# Patient Record
Sex: FEMALE | Race: WHITE | Employment: UNEMPLOYED | URBAN - METROPOLITAN AREA
[De-identification: names, ages, dates, MRNs, and addresses within clinical notes are randomized per-mention and may not be internally consistent; named-entity substitution may affect disease eponyms.]

---

## 2019-08-18 ENCOUNTER — HOSPITAL ENCOUNTER (EMERGENCY)
Age: 44
Discharge: HOME | End: 2019-08-19
Attending: EMERGENCY MEDICINE
Payer: SELF-PAY

## 2019-08-18 DIAGNOSIS — R51.9 RIGHT FACIAL PAIN: Primary | ICD-10-CM

## 2019-08-18 DIAGNOSIS — K04.7 PERIAPICAL ABSCESS: ICD-10-CM

## 2019-08-18 PROCEDURE — 99283 EMERGENCY DEPT VISIT LOW MDM: CPT

## 2019-08-19 VITALS
DIASTOLIC BLOOD PRESSURE: 108 MMHG | HEART RATE: 89 BPM | RESPIRATION RATE: 16 BRPM | BODY MASS INDEX: 35.32 KG/M2 | OXYGEN SATURATION: 100 % | TEMPERATURE: 98.5 F | SYSTOLIC BLOOD PRESSURE: 150 MMHG | WEIGHT: 212 LBS | HEIGHT: 65 IN

## 2019-08-19 RX ORDER — HYDROCODONE BITARTRATE AND ACETAMINOPHEN 5; 325 MG/1; MG/1
1 TABLET ORAL
Qty: 10 TAB | Refills: 0 | Status: SHIPPED | OUTPATIENT
Start: 2019-08-19 | End: 2019-08-24

## 2019-08-19 RX ORDER — CLINDAMYCIN HYDROCHLORIDE 150 MG/1
300 CAPSULE ORAL
Status: DISCONTINUED | OUTPATIENT
Start: 2019-08-19 | End: 2019-08-19

## 2019-08-19 RX ORDER — CLINDAMYCIN HYDROCHLORIDE 150 MG/1
300 CAPSULE ORAL EVERY 6 HOURS
Qty: 80 CAP | Refills: 0 | Status: SHIPPED | OUTPATIENT
Start: 2019-08-19 | End: 2019-08-26

## 2019-08-19 RX ORDER — IBUPROFEN 800 MG/1
800 TABLET ORAL
Qty: 20 TAB | Refills: 0 | Status: SHIPPED | OUTPATIENT
Start: 2019-08-19 | End: 2019-08-26

## 2019-08-19 NOTE — ED PROVIDER NOTES
EMERGENCY DEPARTMENT HISTORY AND PHYSICAL EXAM    Date: 8/18/2019  Patient Name: Diana Salas    History of Presenting Illness     Chief Complaint   Patient presents with    Dental Pain         History Provided By: Patient    Chief Complaint: dental pain       Additional History (Context):   12:45 AM  Diana Salas is a 38 y.o. female  presents to the emergency department C/O right lower dental pain and associated facial swelling gradually worsening over a week. She denies any difficulty opening her mouth or swallowing no fevers no drainage. She has no other medical problems. PCP: None    Current Outpatient Medications   Medication Sig Dispense Refill    clindamycin (CLEOCIN) 150 mg capsule Take 2 Caps by mouth every six (6) hours for 10 days. 80 Cap 0    ibuprofen (MOTRIN) 800 mg tablet Take 1 Tab by mouth every six (6) hours as needed for Pain for up to 7 days. 20 Tab 0    HYDROcodone-acetaminophen (NORCO) 5-325 mg per tablet Take 1 Tab by mouth every six (6) hours as needed for Pain for up to 5 days. Max Daily Amount: 4 Tabs. 10 Tab 0       Past History     Past Medical History:  History reviewed. No pertinent past medical history. Past Surgical History:  Past Surgical History:   Procedure Laterality Date    HX CHOLECYSTECTOMY         Family History:  History reviewed. No pertinent family history. Social History:  Social History     Tobacco Use    Smoking status: Never Smoker    Smokeless tobacco: Never Used   Substance Use Topics    Alcohol use: Yes     Comment: socially    Drug use: Never       Allergies:  No Known Allergies    Review of Systems   Review of Systems   Constitutional: Negative for chills and fever. HENT: Positive for dental problem and facial swelling. Respiratory: Negative for shortness of breath. Cardiovascular: Negative for chest pain. Neurological: Negative for weakness and numbness. All other systems reviewed and are negative.       Physical Exam     Vitals: 08/18/19 2256 08/19/19 0045   BP: 160/90 (!) 150/108   Pulse: 100 89   Resp: 18 16   Temp: 98.5 °F (36.9 °C)    SpO2: 100%    Weight: 96.2 kg (212 lb)    Height: 5' 5\" (1.651 m)      Physical Exam   Constitutional: She is oriented to person, place, and time. She appears well-developed and well-nourished. No distress. Alert, non toxic, appears slightly uncomfortable   HENT:   Head: Normocephalic and atraumatic. Right Ear: Tympanic membrane, external ear and ear canal normal.   Left Ear: Tympanic membrane, external ear and ear canal normal.   Nose: Nose normal.   Mouth/Throat: Uvula is midline, oropharynx is clear and moist and mucous membranes are normal. Mucous membranes are not dry. No trismus in the jaw. Abnormal dentition. Dental abscesses and dental caries present. No oropharyngeal exudate, posterior oropharyngeal edema, posterior oropharyngeal erythema or tonsillar abscesses. No sublingual tenderness or swelling  Able to swallow secretions  Able to open mouth fully  No trismus      Neck: Normal range of motion. Neck supple. Cardiovascular: Normal rate, regular rhythm and normal heart sounds. Pulmonary/Chest: Effort normal and breath sounds normal. No stridor. No respiratory distress. She has no wheezes. She has no rales. Lymphadenopathy:     She has no cervical adenopathy. Neurological: She is alert and oriented to person, place, and time. Skin: Skin is warm and dry. She is not diaphoretic. Psychiatric: She has a normal mood and affect. Judgment normal.   Nursing note and vitals reviewed. Diagnostic Study Results     Labs:   No results found for this or any previous visit (from the past 12 hour(s)). Radiologic Studies:   No orders to display     CT Results  (Last 48 hours)    None        CXR Results  (Last 48 hours)    None          Medical Decision Making   I am the first provider for this patient.     I reviewed the vital signs, available nursing notes, past medical history, past surgical history, family history and social history. Vital Signs: Reviewed the patient's vital signs. Pulse Oximetry Analysis: 100% on RA       Records Reviewed: Nursing Notes and Old Medical Records    Procedures:  Procedures    ED Course:   12:45 AM Initial assessment performed. The patients presenting problems have been discussed, and they are in agreement with the care plan formulated and outlined with them. I have encouraged them to ask questions as they arise throughout their visit. 12:50 AM  Patient examined by [employer]. Harsha Cagle MD. agrees patient can be discharged with clindamycin and close dental/oral surgery follow-up    Discussion:  Pt presents with dental pain and right facial swelling for a week. No drainable abscess, she is afebrile and nontoxic able to fully open mouth no sublingual tenderness or swelling. Doubt Kevin's angina. Will treat with clindamycin and have patient follow-up with dentist and oral surgeon this week. Strict return precautions given, pt offering no questions or complaints. Diagnosis and Disposition     DISCHARGE NOTE:    Tammy Hard  results have been reviewed with her. She has been counseled regarding her diagnosis, treatment, and plan. She verbally conveys understanding and agreement of the signs, symptoms, diagnosis, treatment and prognosis and additionally agrees to follow up as discussed. She also agrees with the care-plan and conveys that all of her questions have been answered. I have also provided discharge instructions for her that include: educational information regarding their diagnosis and treatment, and list of reasons why they would want to return to the ED prior to their follow-up appointment, should her condition change. She has been provided with education for proper emergency department utilization. CLINICAL IMPRESSION:    1. Right facial pain    2. Periapical abscess        PLAN:  1. D/C Home  2.    Current Discharge Medication List      START taking these medications    Details   clindamycin (CLEOCIN) 150 mg capsule Take 2 Caps by mouth every six (6) hours for 10 days. Qty: 80 Cap, Refills: 0      ibuprofen (MOTRIN) 800 mg tablet Take 1 Tab by mouth every six (6) hours as needed for Pain for up to 7 days. Qty: 20 Tab, Refills: 0      HYDROcodone-acetaminophen (NORCO) 5-325 mg per tablet Take 1 Tab by mouth every six (6) hours as needed for Pain for up to 5 days. Max Daily Amount: 4 Tabs. Qty: 10 Tab, Refills: 0    Associated Diagnoses: Right facial pain; Periapical abscess           3. Follow-up Information     Follow up With Specialties Details Why Contact Info    Dental clinic provided  Schedule an appointment as soon as possible for a visit in 1 day For follow up with dentist     [facility] EMERGENCY DEPT Emergency Medicine  As needed, If symptoms worsen [address] [address]  [phone]                 Please note that this dictation was completed with [employer], the computer voice recognition software. Quite often unanticipated grammatical, syntax, homophones, and other interpretive errors are inadvertently transcribed by the computer software. Please disregard these errors. Please excuse any errors that have escaped final proofreading.

## 2019-08-19 NOTE — DISCHARGE INSTRUCTIONS
Dr. Jeffrey Villavicencio, [facility] [address] [facility] [place] [facility] 159  [address]:  [address] [facility], [address] [facility]  [phone]  Hoda Nip, and Extractions    [facility]  [address] [facility], [address] [place]  [phone]  [place]oda Nip, and Extractions    [facility]  [address] [facility] 159  [phone]  Fillings, Cleaning, and [address] [facility] [place] [address] [facility], [address] [facility]  [phone]    [facility] Department  [address] [facility], [address]  [phone]  Ages 3-18, if attending [facility]  [address] [facility], [address] [facility] [address] [facility] [address]  [phone]  Extractions, Herbert Degroot, [facility]    [facility], [address] [facility] [address] [facility] [address]  [phone]  Oral Surgery - $70 required  [place] Soliz, Extractions - $100 Required    [facility]   [address] [place] [address], [address]  [phone]  [facility] Only    [address], [address] [facility]  Dental Clinic Open September to June         Tooth and Gum Pain: Care Instructions  Your Care Instructions    The most common causes of dental pain are tooth decay and gum disease. Pain can also be caused by an infection of the tooth (abscess) or the gums. Or you may have pain from a broken or cracked tooth. Other causes of pain include infection and damage to a tooth from nervous grinding of your teeth. A wisdom tooth can be painful when it is coming in but cannot break through the gum. It can also be painful when the tooth is only partway in and extra gum tissue has formed around it. The tissue can get inflamed (pericoronitis), and sometimes it gets infected. Prompt dental care can help find the cause of your toothache and keep the tooth from dying or gum disease from getting worse. Self-care at home may reduce your pain and discomfort. Follow-up care is a key part of your treatment and safety. Be sure to make and go to all appointments, and call your dentist or doctor if you are having problems. It's also a good idea to know your test results and keep a list of the medicines you take. How can you care for yourself at home? · To reduce pain and facial swelling, put an ice or cold pack on the outside of your cheek for 10 to 20 minutes at a time. Put a thin cloth between the ice and your skin. Do not use heat. · If your doctor prescribed antibiotics, take them as directed. Do not stop taking them just because you feel better. You need to take the full course of antibiotics. · Ask your doctor if you can take an over-the-counter pain medicine, such as acetaminophen (Tylenol), ibuprofen (Advil, Motrin), or naproxen (Aleve). Be safe with medicines. Read and follow all instructions on the label. · Avoid very hot, cold, or sweet foods and drinks if they increase your pain. · Rinse your mouth with warm salt water every 2 hours to help relieve pain and swelling. Mix 1 teaspoon of salt in 8 ounces of water. · Talk to your dentist about using special toothpaste for sensitive teeth. To reduce pain on contact with heat or cold or when brushing, brush with this toothpaste regularly or rub a small amount of the paste on the sensitive area with a clean finger 2 or 3 times a day. Floss gently between your teeth. · Do not smoke or use spit tobacco. Tobacco use can make gum problems worse, decreases your ability to fight infection in your gums, and delays healing. If you need help quitting, talk to your doctor about stop-smoking programs and medicines. These can increase your chances of quitting for good. When should you call for help? Call 911 anytime you think you may need emergency care.  For example, call if:    · You have trouble breathing.    Call your dentist or doctor now or seek immediate medical care if:    · You have signs of infection, such as:  ? Increased pain, swelling, warmth, or redness. ? Red streaks leading from the area. ? Pus draining from the area. ? A fever.    Watch closely for changes in your health, and be sure to contact your doctor if:    · You do not get better as expected. Where can you learn more? Go to http://frederic-micaela.info/. Enter [id number] [id number] in the search box to learn more about \"Tooth and Gum Pain: Care Instructions. \"  Current as of: October 3, 2018  Content Version: 12.1  © 2241-6849 [employer]. Care instructions adapted under license by [employer] (which disclaims liability or warranty for this information). If you have questions about a medical condition or this instruction, always ask your healthcare professional. [address] any warranty or liability for your use of this information.

## 2019-08-25 ENCOUNTER — HOSPITAL ENCOUNTER (EMERGENCY)
Age: 44
Discharge: HOME | End: 2019-08-26
Attending: EMERGENCY MEDICINE
Payer: SELF-PAY

## 2019-08-25 VITALS
SYSTOLIC BLOOD PRESSURE: 161 MMHG | HEART RATE: 92 BPM | BODY MASS INDEX: 36.65 KG/M2 | WEIGHT: 220 LBS | HEIGHT: 65 IN | DIASTOLIC BLOOD PRESSURE: 86 MMHG | OXYGEN SATURATION: 100 % | RESPIRATION RATE: 14 BRPM | TEMPERATURE: 98.8 F

## 2019-08-25 DIAGNOSIS — K02.9 DENTAL CARIES: Primary | ICD-10-CM

## 2019-08-25 DIAGNOSIS — K08.89 PAIN, DENTAL: ICD-10-CM

## 2019-08-25 PROCEDURE — 99282 EMERGENCY DEPT VISIT SF MDM: CPT

## 2019-08-25 PROCEDURE — 75810000283 HC INJECTION NERVE BLOCK

## 2019-08-26 PROCEDURE — 74011250636 HC RX REV CODE- 250/636: Performed by: EMERGENCY MEDICINE

## 2019-08-26 PROCEDURE — 74011000250 HC RX REV CODE- 250: Performed by: EMERGENCY MEDICINE

## 2019-08-26 RX ORDER — BUPIVACAINE HYDROCHLORIDE 2.5 MG/ML
10 INJECTION, SOLUTION EPIDURAL; INFILTRATION; INTRACAUDAL
Status: COMPLETED | OUTPATIENT
Start: 2019-08-26 | End: 2019-08-26

## 2019-08-26 RX ORDER — OXYCODONE AND ACETAMINOPHEN 5; 325 MG/1; MG/1
1 TABLET ORAL
Qty: 16 TAB | Refills: 0 | Status: SHIPPED | OUTPATIENT
Start: 2019-08-26 | End: 2019-09-02

## 2019-08-26 RX ORDER — LIDOCAINE HYDROCHLORIDE 10 MG/ML
5 INJECTION, SOLUTION EPIDURAL; INFILTRATION; INTRACAUDAL; PERINEURAL ONCE
Status: COMPLETED | OUTPATIENT
Start: 2019-08-26 | End: 2019-08-26

## 2019-08-26 RX ORDER — IBUPROFEN 800 MG/1
800 TABLET ORAL
Qty: 20 TAB | Refills: 0 | Status: SHIPPED | OUTPATIENT
Start: 2019-08-26 | End: 2019-09-02

## 2019-08-26 RX ORDER — CLINDAMYCIN HYDROCHLORIDE 150 MG/1
300 CAPSULE ORAL 3 TIMES DAILY
Qty: 60 CAP | Refills: 0 | Status: SHIPPED | OUTPATIENT
Start: 2019-08-26 | End: 2019-09-05

## 2019-08-26 RX ADMIN — Medication 5 ML: at 02:30

## 2019-08-26 RX ADMIN — BUPIVACAINE HYDROCHLORIDE 25 MG: 2.5 INJECTION, SOLUTION EPIDURAL; INFILTRATION; INTRACAUDAL; PERINEURAL at 02:30

## 2019-08-26 NOTE — ED PROVIDER NOTES
EMERGENCY DEPARTMENT HISTORY AND PHYSICAL EXAM    Date: 8/25/2019  Patient Name: Merline Canard    History of Presenting Illness     Chief Complaint   Patient presents with    Dental Pain         History Provided By: Patient    Merline Canard is a 38 y.o. female who presents to the emergency department C/O dental pain. Associated sxs include facial swelling. She reports long-standing history of dental decay to her right most posterior molar. Patient was seen in this facility 8 days ago for pain to same tooth. Was prescribed clindamycin Motrin and Norco which she has been taking with no relief of the swelling and pain. Patient states she is new to the area and will be here for another month. She does not have a dentist or dental insurance. Pt denies fever, recent illness, and any other sxs or complaints. PCP: None    Current Outpatient Medications   Medication Sig Dispense Refill    oxyCODONE-acetaminophen (PERCOCET) 5-325 mg per tablet Take 1 Tab by mouth every four (4) hours as needed for Pain for up to 7 days. Max Daily Amount: 6 Tabs. 16 Tab 0    ibuprofen (MOTRIN) 800 mg tablet Take 1 Tab by mouth every eight (8) hours as needed for Pain for up to 7 days. 20 Tab 0    clindamycin (CLEOCIN) 150 mg capsule Take 2 Caps by mouth three (3) times daily for 10 days. 60 Cap 0    ibuprofen (MOTRIN) 800 mg tablet Take 1 Tab by mouth every six (6) hours as needed for Pain for up to 7 days. 20 Tab 0       Past History     Past Medical History:  History reviewed. No pertinent past medical history. Past Surgical History:  Past Surgical History:   Procedure Laterality Date    HX CHOLECYSTECTOMY         Family History:  History reviewed. No pertinent family history.     Social History:  Social History     Tobacco Use    Smoking status: Never Smoker    Smokeless tobacco: Never Used   Substance Use Topics    Alcohol use: Yes     Comment: socially    Drug use: Never       Allergies:  No Known Allergies      Review of Systems   Review of Systems   Constitutional: Negative for fever. HENT: Positive for dental problem and facial swelling. Negative for congestion. Respiratory: Negative for cough. All other systems reviewed and are negative. Physical Exam     Vitals:    08/25/19 2356   BP: 161/86   Pulse: 92   Resp: 14   Temp: 98.8 °F (37.1 °C)   SpO2: 100%   Weight: 99.8 kg (220 lb)   Height: 5' 5\" (1.651 m)     Physical Exam   Constitutional: She is oriented to person, place, and time. She appears well-developed and well-nourished. No distress. HENT:   Head: Normocephalic and atraumatic. Right Ear: External ear normal.   Left Ear: External ear normal.   Nose: Nose normal.   Mouth/Throat: Oropharynx is clear and moist and mucous membranes are normal. No oral lesions. No trismus in the jaw. Abnormal dentition. Dental caries present. No dental abscesses or uvula swelling. No oropharyngeal exudate. Mild facial swelling noted to right mandibular region as shown without erythema warmth or fluctuance noted, able to open close mouth easily, dental decay as noted right most posterior molar with no obvious abscess noted   Eyes: Pupils are equal, round, and reactive to light. Conjunctivae and EOM are normal.   Neck: Normal range of motion. Neck supple. Musculoskeletal: Normal range of motion. Neurological: She is alert and oriented to person, place, and time. Skin: Skin is warm and dry. Psychiatric: She has a normal mood and affect. Her behavior is normal.   Nursing note and vitals reviewed. Diagnostic Study Results     Labs -   No results found for this or any previous visit (from the past 12 hour(s)).     Radiologic Studies -   No orders to display     CT Results  (Last 48 hours)    None        CXR Results  (Last 48 hours)    None          Medications given in the ED-  Medications   lidocaine (PF) (XYLOCAINE) 10 mg/mL (1 %) injection 5 mL (5 mL SubCUTAneous Given by Provider 8/26/19 6948) bupivacaine (PF) (MARCAINE) 0.25 % (2.5 mg/mL) injection 25 mg (25 mg Peripheral Nerve Block Given by Provider 8/26/19 0230)         Medical Decision Making   I am the first provider for this patient. I reviewed the vital signs, available nursing notes, past medical history, past surgical history, family history and social history. Vital Signs-Reviewed the patient's vital signs. Records Reviewed: Nursing Notes    Procedures:  Nerve Block  Date/Time: 8/26/2019 2:26 AM  Performed by: Kenyatta Sears MD  Authorized by: Kenyatta Sears MD     Consent:     Consent obtained:  Verbal    Consent given by:  Patient    Risks discussed:  Infection, nerve damage, swelling, bleeding, pain and unsuccessful block    Alternatives discussed:  Alternative treatment and no treatment  Indications:     Indications:  Pain relief  Location:     Body area:  Head    Head nerve blocked: inferior alveolar. Laterality:  Right  Procedure details (see MAR for exact dosages): Block needle gauge:  27 G    Anesthetic injected:  Bupivacaine 0.25% w/o epi and lidocaine 1% w/o epi    Steroid injected:  None    Additive injected:  None    Injection procedure:  Anatomic landmarks identified    Paresthesia:  Immediately resolved  Post-procedure details:     Dressing:  None    Outcome:  Anesthesia achieved    Patient tolerance of procedure: Tolerated well, no immediate complications  Comments:      Written by Alejandrina Cornelius. Maylin Rahman, ED Scribe, as dictated by Kayleen Turner MD.         ED Course:   12:49 AM   Initial assessment performed. The patients presenting problems have been discussed, and they are in agreement with the care plan formulated and outlined with them. I have encouraged them to ask questions as they arise throughout their visit. 2:05 AM  Consult with Dr. Charan Turner who reviewed chart and will evaluate patient    FACE-TO-FACE PROGRESS NOTE:  2:12 AM  Persistent sxs.  She is having difficulty completing her dental follow up. Has plans to drive to [address] [facility] and oral surgeons have told her no appts until March 2020. Will provide dental block here. She has no trismus or signs of Kevin's angina. No stroke sxs to suggest cavernous sinus thrombosis. I have personally seen and examined the patient, reviewed the ANITA's note and agree with findings and plan. Written by Dalila Gomez ED Scribe, as dictated by Eloise Aggarwal MD.    Discussion: 38 y.o. female with chronic dental caries and dental pain to right most anterior mandibular molar. Is currently on clindamycin as prescribed from her previous visit a week ago. She is afebrile with appropriate vital signs no red flags on exam.  Plan for continued antibiotics and dental follow-up with strict return precautions discussed. Diagnosis and Disposition       DISCHARGE NOTE:  2:36 AM  Jose Beck  results have been reviewed with her. She has been counseled regarding her diagnosis, treatment, and plan. She verbally conveys understanding and agreement of the signs, symptoms, diagnosis, treatment and prognosis and additionally agrees to follow up as discussed. She also agrees with the care-plan and conveys that all of her questions have been answered. I have also provided discharge instructions for her that include: educational information regarding their diagnosis and treatment, and list of reasons why they would want to return to the ED prior to their follow-up appointment, should her condition change. She has been provided with education for proper emergency department utilization. CLINICAL IMPRESSION:    1. Dental caries    2. Pain, dental        PLAN:  1. D/C Home  2. Discharge Medication List as of 8/26/2019  2:38 AM      START taking these medications    Details   oxyCODONE-acetaminophen (PERCOCET) 5-325 mg per tablet Take 1 Tab by mouth every four (4) hours as needed for Pain for up to 7 days.  Max Daily Amount: 6 Tabs., Print, Disp-16 Tab, R-0      !! ibuprofen (MOTRIN) 800 mg tablet Take 1 Tab by mouth every eight (8) hours as needed for Pain for up to 7 days. , Print, Disp-20 Tab, R-0       !! - Potential duplicate medications found. Please discuss with provider. CONTINUE these medications which have CHANGED    Details   clindamycin (CLEOCIN) 150 mg capsule Take 2 Caps by mouth three (3) times daily for 10 days. , Print, Disp-60 Cap, R-0         CONTINUE these medications which have NOT CHANGED    Details   !! ibuprofen (MOTRIN) 800 mg tablet Take 1 Tab by mouth every six (6) hours as needed for Pain for up to 7 days. , Print, Disp-20 Tab, R-0       !! - Potential duplicate medications found. Please discuss with provider. STOP taking these medications       HYDROcodone-acetaminophen (NORCO) 5-325 mg per tablet Comments:   Reason for Stopping:             3.   Follow-up Information     Follow up With Specialties Details Why Contact Info    see list of dental clinics  Go to      your dentist  Schedule an appointment as soon as possible for a visit      [facility] EMERGENCY DEPT Emergency Medicine  As needed, If symptoms worsen [address] [address]  [phone]    [address] [place] [address]   for primary care follow up [address] [facility], [address] [place] [address]  [phone]              Please note that this dictation was completed with [employer], the computer voice recognition software. Quite often unanticipated grammatical, syntax, homophones, and other interpretive errors are inadvertently transcribed by the computer software. Please disregard these errors. Please excuse any errors that have escaped final proofreading.

## 2019-08-26 NOTE — DISCHARGE INSTRUCTIONS
Dr. Brittney Montaño, [facility] [address], [facility]  [address]:  330 [facility], 101 [facility]  [phone]  Fayetta Su, and Extractions    [facility]  [address] [facility], [address]  [phone]  Fayetta Su, and Extractions    [facility]  [address] [facility] 159  [phone]  Fillings, Cleaning, and [address] [place], [address]  [address], [address]  [phone]  Ages 3-18, if attending [address], [address]  [phone]  Extractions, [place], [address] of [address], 11 [facility]  [phone]  Oral Surgery - $70 required  Wong Motto, Extractions - $100 Required    [facility]   [address] [place], [address]  [phone]  [facility] Only    [address], 15[address] [facility]  Dental Clinic Open September to June      Patient Education        Periodontal Conditions: Care Instructions  Your Care Instructions    Periodontal conditions affect the gums, bone, and tissue that surround and support the teeth. The most common problems are caused by plaque. Plaque is a thin film of bacteria that sticks to teeth above and below the gum line. It can build up and harden into tartar. The bacteria in plaque and tartar can cause gum disease. Gingivitis is a disease that affects the gums (gingiva). The gums are the soft tissue that surrounds the teeth. Gingivitis causes red, swollen, tender gums that bleed easily when brushed, persistent bad breath, and sensitive teeth. Because it is not painful, many people do not get treatment when they should.  Gingivitis can be reversed with good dental care. Periodontitis is a more advanced disease that affects more than the gums. The gums pull away from the teeth. This leaves deep pockets where bacteria can grow. The disease can damage the bones that support the teeth. The teeth may get loose and fall out. A periodontal condition should be treated as soon as it is found. Finding gum problems early, treating them right away, and having regular checkups bring the best results. You can treat mild periodontal conditions by brushing and flossing your teeth every day. Your dentist may prescribe a mouthwash to kill the bacteria that can damage teeth and gums. Your dentist may have you take antibiotics to treat infection from moderate periodontal disease. If your gums have pulled away from your teeth, you may need cleaning between the teeth and gums right down to the teeth roots. This is called root planing and scaling. If you have severe periodontal disease, you may need surgery to remove diseased gum tissue or repair bone damage. Follow-up care is a key part of your treatment and safety. Be sure to make and go to all appointments, and call your dentist if you are having problems. It's also a good idea to know your test results and keep a list of the medicines you take. How can you care for yourself at home? · If your dentist prescribed antibiotics, take them as directed. Do not stop taking them just because you feel better. You need to take the full course of antibiotics. · Brush your teeth twice a day, in the morning and at night. ? Use a toothbrush with soft, rounded-end bristles and a head that is small enough to reach all parts of your teeth and mouth. Replace your toothbrush every 3 to 4 months. ? Use a fluoride toothpaste. ? Place the brush at a 45-degree angle where the teeth meet the gums. Press firmly, and gently rock the brush back and forth using small circular movements.   ? Brush chewing surfaces vigorously with short back-and-forth strokes. ? Brush your tongue from back to front. · Floss at least once a day. Choose the type and flavor that you like best.  · Have your teeth cleaned by a professional at least twice a year. · Ask your dentist about using an antibacterial mouthwash to help reduce bacteria. · Rinse your mouth with water or chew sugar-free gum after meals if you can't brush your teeth. · Do not smoke or use smokeless tobacco. Tobacco use can cause periodontal disease. When should you call for help? Call your dentist now or seek immediate medical care if:    · You have symptoms of infection, such as:  ? Increased pain, swelling, warmth, or redness. ? Red streaks leading from the area. ? Pus draining from the area. ? A fever.    Watch closely for changes in your health, and be sure to contact your dentist if:    · You have new or worse tooth pain.     · You do not get better as expected. Where can you learn more? Go to http://frederic-micaela.info/. Enter O759 in the search box to learn more about \"Periodontal Conditions: Care Instructions. \"  Current as of: October 3, 2018  Content Version: 12.1  © 8471-2733 [employer]. Care instructions adapted under license by [employer] (which disclaims liability or warranty for this information). If you have questions about a medical condition or this instruction, always ask your healthcare professional. [address] any warranty or liability for your use of this information.